# Patient Record
Sex: MALE
[De-identification: names, ages, dates, MRNs, and addresses within clinical notes are randomized per-mention and may not be internally consistent; named-entity substitution may affect disease eponyms.]

---

## 2024-09-30 PROBLEM — Z00.00 ENCOUNTER FOR PREVENTIVE HEALTH EXAMINATION: Status: ACTIVE | Noted: 2024-09-30

## 2024-10-01 ENCOUNTER — RX ONLY (RX ONLY)
Age: 59
End: 2024-10-01

## 2024-10-01 ENCOUNTER — OFFICE (OUTPATIENT)
Dept: URBAN - METROPOLITAN AREA CLINIC 9 | Facility: CLINIC | Age: 59
Setting detail: OPHTHALMOLOGY
End: 2024-10-01
Payer: COMMERCIAL

## 2024-10-01 ENCOUNTER — APPOINTMENT (OUTPATIENT)
Dept: NEUROLOGY | Facility: CLINIC | Age: 59
End: 2024-10-01
Payer: COMMERCIAL

## 2024-10-01 VITALS
BODY MASS INDEX: 21.67 KG/M2 | HEIGHT: 72 IN | SYSTOLIC BLOOD PRESSURE: 133 MMHG | WEIGHT: 160 LBS | HEART RATE: 69 BPM | OXYGEN SATURATION: 97 % | DIASTOLIC BLOOD PRESSURE: 85 MMHG

## 2024-10-01 DIAGNOSIS — M54.16 RADICULOPATHY, LUMBAR REGION: ICD-10-CM

## 2024-10-01 DIAGNOSIS — H11.151: ICD-10-CM

## 2024-10-01 DIAGNOSIS — H52.03: ICD-10-CM

## 2024-10-01 DIAGNOSIS — H25.13: ICD-10-CM

## 2024-10-01 DIAGNOSIS — G40.109 LOCALIZATION-RELATED (FOCAL) (PARTIAL) SYMPTOMATIC EPILEPSY AND EPILEPTIC SYNDROMES WITH SIMPLE PARTIAL SEIZURES, NOT INTRACTABLE, W/OUT STATUS EPILEPTICUS: ICD-10-CM

## 2024-10-01 DIAGNOSIS — H01.001: ICD-10-CM

## 2024-10-01 DIAGNOSIS — H40.1132: ICD-10-CM

## 2024-10-01 DIAGNOSIS — H11.043: ICD-10-CM

## 2024-10-01 DIAGNOSIS — H01.004: ICD-10-CM

## 2024-10-01 PROCEDURE — 92004 COMPRE OPH EXAM NEW PT 1/>: CPT | Performed by: OPHTHALMOLOGY

## 2024-10-01 PROCEDURE — 92250 FUNDUS PHOTOGRAPHY W/I&R: CPT | Performed by: OPHTHALMOLOGY

## 2024-10-01 PROCEDURE — 92015 DETERMINE REFRACTIVE STATE: CPT | Performed by: OPHTHALMOLOGY

## 2024-10-01 PROCEDURE — G2211 COMPLEX E/M VISIT ADD ON: CPT

## 2024-10-01 PROCEDURE — 99205 OFFICE O/P NEW HI 60 MIN: CPT

## 2024-10-01 RX ORDER — PHENYTOIN SODIUM 100 MG/1
100 CAPSULE ORAL
Refills: 0 | Status: ACTIVE | COMMUNITY

## 2024-10-01 RX ORDER — CLONAZEPAM 0.5 MG/1
0.5 TABLET, ORALLY DISINTEGRATING ORAL
Qty: 8 | Refills: 0 | Status: ACTIVE | COMMUNITY
Start: 2024-10-01 | End: 1900-01-01

## 2024-10-01 RX ORDER — PHENYTOIN SODIUM 300 MG/1
300 CAPSULE, EXTENDED RELEASE ORAL
Refills: 0 | Status: ACTIVE | COMMUNITY

## 2024-10-01 RX ORDER — GABAPENTIN 100 MG/1
100 CAPSULE ORAL
Qty: 60 | Refills: 1 | Status: ACTIVE | COMMUNITY
Start: 2024-10-01 | End: 1900-01-01

## 2024-10-01 ASSESSMENT — LID EXAM ASSESSMENTS
OD_MEIBOMITIS: RUL 1+
OS_BLEPHARITIS: LUL 2+
OS_MEIBOMITIS: LUL 2+
OD_BLEPHARITIS: RUL 1+

## 2024-10-01 ASSESSMENT — KERATOMETRY
OD_K1POWER_DIOPTERS: 39.00
OS_K1POWER_DIOPTERS: 39.50
OS_AXISANGLE_DEGREES: 107
OD_AXISANGLE_DEGREES: 090
OS_K2POWER_DIOPTERS: 39.75
OD_K2POWER_DIOPTERS: 39.00

## 2024-10-01 ASSESSMENT — REFRACTION_MANIFEST
OD_ADD: +3.00
OD_CYLINDER: SPHERE
OS_SPHERE: +3.00
OS_VA1: 20/20-1
OD_VA2: 20/20(J1+)
OD_AXIS: 045
OU_VA: 20/20
OS_CYLINDER: +0.25
OS_SPHERE: +0.50
OD_SPHERE: PLANO
OD_VA1: 20/20-1
OD_VA2: 20/20(J1+)
OS_VA2: 20/20(J1+)
OS_CYLINDER: SPHERE
OD_SPHERE: +3.00
OS_ADD: +3.00
OD_CYLINDER: +0.25
OS_VA2: 20/20(J1+)
OS_AXIS: 160

## 2024-10-01 ASSESSMENT — REFRACTION_AUTOREFRACTION
OS_AXIS: 161
OD_AXIS: 043
OS_CYLINDER: +0.25
OD_SPHERE: PLANO
OD_CYLINDER: +0.25
OS_SPHERE: +0.25

## 2024-10-01 ASSESSMENT — CORNEAL PTERYGIUM
OD_PTERYGIUM: ENCROACHING PUPIL 1MM
OS_PTERYGIUM: ENCROACHING PUPIL 2MM

## 2024-10-01 ASSESSMENT — VISUAL ACUITY
OS_BCVA: 20/20-2
OD_BCVA: 20/30

## 2024-10-01 ASSESSMENT — CONFRONTATIONAL VISUAL FIELD TEST (CVF)
OD_FINDINGS: FULL
OS_FINDINGS: FULL

## 2024-10-01 ASSESSMENT — TONOMETRY: OS_IOP_MMHG: 21

## 2024-10-01 NOTE — PHYSICAL EXAM
[FreeTextEntry1] : NEUROLOGIC EXAM:  MENTAL STATUS: Alert and Oriented to person, place and time. Speech is fluent, without aphasia or dysarthria. Able to name, repeat and follow commands. Behavior and affect appropriate to situation.     Eyes injected today, just came from optho dilated exam                    CRANIAL NERVES: CN 2:    Visual fields appear full to confrontation OU CN 3, 4, 6: Extraocular movements are intact. No nystagmus or ophthalmoplegia is evident. Pupils are equally round CN 5:     Facial sensation is intact to light touch in all 3 divisions CN 7:     Facial excursion is full and symmetric bilaterally.  MOTOR: Bilateral upper extremities are antigravity without orbiting or drift. bilateral lower extremities are full range of motion without drift.  SENSORY: Intact to light touch perception in all four extremities DTRS: 2+ throughout, RLE 3+ at knee,  no ankle clonus  No back pain or ttp  COORD: Finger to nose testing without dysmetria bilaterally.  GAIT: Normal station and gait. [Over the Past 2 Weeks, Have You Felt Down, Depressed, or Hopeless?] : 1.) Over the past 2 weeks, have you felt down, depressed, or hopeless? No [Over the Past 2 Weeks, Have You Felt Little Interest or Pleasure Doing Things?] : 2.) Over the past 2 weeks, have you felt little interest or pleasure doing things? No

## 2024-10-01 NOTE — ASSESSMENT
[FreeTextEntry1] : # Post-traumatic Focal epilepsy - check REEG - c/w  ER QHS for now - obtain recent labwork from PCP office including PHT level - MRI brain epilepsy protocol to ascertain epileptic TBI lesion to help on seizure localization - RESCUE: 0.5mg CLONAZEPAM ODT - to be taken after convulsive sz - education provided  >>> INPATIENT EMU VEEG MONITORING NEED: >>> - Diagnostic inpatient video EEG monitoring evaluation over at least 2-3 days, which is medically necessary to obtain both video and EEG for correlation and accurate diagnosis and management. There is significant concern for subclinical seizures that maybe happening during sleep or drowsiness in light of recent breakthrough seizure despite compliance to meds. This evaluation is critical for improving patient's long-term prognosis and quality of life. I am confident that the information obtained from the EMU will lead to a more precise diagnosis and a tailored treatment plan that will ultimately reduce the frequency and severity of patient's seizures.   #RLE paresthesia / numbness - positional - passive compressive neuropathy / radiculopathy - Trial of gabapentin 100 BID - start qhs first and increase to BID if tolerating/helpful - EMG/NCV RLE - MRI L-spine to eval for discopathy, stenosis - PT referral  NO DRIVING advised.  Extensive education and counseling done as per my usual protocol - relevant to the neurological issues above. Patient's and dtr's questions and concerns were addressed, they voiced understanding.  --- ****Patient Discussion / Education***: As per my usual protocol, the patient was advised in regards to risks and driving privileges associated with the [place] Guidelines.  The patient was advised in regards to the risk of seizures and general seizure safety recommendations including not to be bathing alone, climbing to high places and operating heavy machinery. The importance of compliance with medications was reinforced. Sleep hygiene and the risks of sleep disruption were discussed. Surgical options/risks/benefits for intractable epilepsy were discussed where applicable. Patient was asked to obtain copies of pertinent prior medical records or studies. We also discussed SUDEP and importance of medication and follow up compliance at length. All questions and concerns were addressed to patient's satisfaction.  Total time spent on the day of the visit, including pre-visit and post-visit time was 65 minutes.

## 2024-12-06 ENCOUNTER — OFFICE (OUTPATIENT)
Dept: URBAN - METROPOLITAN AREA CLINIC 38 | Facility: CLINIC | Age: 59
Setting detail: OPHTHALMOLOGY
End: 2024-12-06
Payer: COMMERCIAL

## 2024-12-06 DIAGNOSIS — H40.1132: ICD-10-CM

## 2024-12-06 PROCEDURE — 92020 GONIOSCOPY: CPT | Performed by: STUDENT IN AN ORGANIZED HEALTH CARE EDUCATION/TRAINING PROGRAM

## 2024-12-06 PROCEDURE — 99214 OFFICE O/P EST MOD 30 MIN: CPT | Performed by: STUDENT IN AN ORGANIZED HEALTH CARE EDUCATION/TRAINING PROGRAM

## 2024-12-06 PROCEDURE — 92083 EXTENDED VISUAL FIELD XM: CPT | Performed by: STUDENT IN AN ORGANIZED HEALTH CARE EDUCATION/TRAINING PROGRAM

## 2024-12-06 PROCEDURE — 92133 CPTRZD OPH DX IMG PST SGM ON: CPT | Performed by: STUDENT IN AN ORGANIZED HEALTH CARE EDUCATION/TRAINING PROGRAM

## 2024-12-06 PROCEDURE — 76514 ECHO EXAM OF EYE THICKNESS: CPT | Performed by: STUDENT IN AN ORGANIZED HEALTH CARE EDUCATION/TRAINING PROGRAM

## 2024-12-06 ASSESSMENT — REFRACTION_AUTOREFRACTION
OS_AXIS: 079
OD_AXIS: 117
OS_SPHERE: +0.50
OS_CYLINDER: -0.25
OD_SPHERE: +0.25
OD_CYLINDER: -0.25

## 2024-12-06 ASSESSMENT — REFRACTION_MANIFEST
OD_SPHERE: +3.00
OD_SPHERE: PLANO
OD_VA1: 20/20-1
OS_VA1: 20/20-1
OD_VA2: 20/20(J1+)
OS_AXIS: 160
OU_VA: 20/20
OS_CYLINDER: +0.25
OD_CYLINDER: +0.25
OS_ADD: +3.00
OS_CYLINDER: SPHERE
OD_CYLINDER: SPHERE
OS_SPHERE: +3.00
OS_VA2: 20/20(J1+)
OD_AXIS: 045
OD_VA2: 20/20(J1+)
OD_ADD: +3.00
OS_VA2: 20/20(J1+)
OS_SPHERE: +0.50

## 2024-12-06 ASSESSMENT — VISUAL ACUITY
OD_BCVA: 20/25-2
OS_BCVA: 20/20-2

## 2024-12-06 ASSESSMENT — KERATOMETRY
OS_K1POWER_DIOPTERS: 39.50
OD_K1POWER_DIOPTERS: 39.00
OS_K2POWER_DIOPTERS: 39.75
OS_AXISANGLE_DEGREES: 109
OD_AXISANGLE_DEGREES: 051
OD_K2POWER_DIOPTERS: 39.25

## 2024-12-06 ASSESSMENT — CORNEAL PTERYGIUM
OS_PTERYGIUM: ENCROACHING PUPIL 2MM
OD_PTERYGIUM: ENCROACHING PUPIL 1MM

## 2024-12-06 ASSESSMENT — PACHYMETRY
OS_CT_UM: 578
OD_CT_CORRECTION: -3
OS_CT_CORRECTION: -2
OD_CT_UM: 586

## 2024-12-06 ASSESSMENT — LID EXAM ASSESSMENTS
OS_MEIBOMITIS: LUL 2+
OD_MEIBOMITIS: RUL 1+
OS_BLEPHARITIS: LUL 2+
OD_BLEPHARITIS: RUL 1+

## 2024-12-06 ASSESSMENT — CONFRONTATIONAL VISUAL FIELD TEST (CVF)
OS_FINDINGS: FULL
OD_FINDINGS: FULL

## 2025-05-23 ENCOUNTER — OFFICE (OUTPATIENT)
Dept: URBAN - METROPOLITAN AREA CLINIC 38 | Facility: CLINIC | Age: 60
Setting detail: OPHTHALMOLOGY
End: 2025-05-23
Payer: COMMERCIAL

## 2025-05-23 DIAGNOSIS — H01.001: ICD-10-CM

## 2025-05-23 DIAGNOSIS — H11.043: ICD-10-CM

## 2025-05-23 DIAGNOSIS — H25.13: ICD-10-CM

## 2025-05-23 DIAGNOSIS — H40.1132: ICD-10-CM

## 2025-05-23 PROCEDURE — 92133 CPTRZD OPH DX IMG PST SGM ON: CPT | Performed by: STUDENT IN AN ORGANIZED HEALTH CARE EDUCATION/TRAINING PROGRAM

## 2025-05-23 PROCEDURE — 92083 EXTENDED VISUAL FIELD XM: CPT | Performed by: STUDENT IN AN ORGANIZED HEALTH CARE EDUCATION/TRAINING PROGRAM

## 2025-05-23 PROCEDURE — 99213 OFFICE O/P EST LOW 20 MIN: CPT | Performed by: STUDENT IN AN ORGANIZED HEALTH CARE EDUCATION/TRAINING PROGRAM

## 2025-05-23 ASSESSMENT — PACHYMETRY
OD_CT_UM: 586
OS_CT_CORRECTION: -2
OS_CT_UM: 578
OD_CT_CORRECTION: -3

## 2025-05-23 ASSESSMENT — REFRACTION_MANIFEST
OS_VA2: 20/20(J1+)
OD_ADD: +3.00
OD_SPHERE: +3.00
OD_VA2: 20/20(J1+)
OD_AXIS: 045
OS_SPHERE: +3.00
OS_CYLINDER: +0.25
OD_CYLINDER: SPHERE
OD_SPHERE: PLANO
OD_VA2: 20/20(J1+)
OS_VA1: 20/20-1
OU_VA: 20/20
OS_ADD: +3.00
OS_VA2: 20/20(J1+)
OD_CYLINDER: +0.25
OS_AXIS: 160
OD_VA1: 20/20-1
OS_CYLINDER: SPHERE
OS_SPHERE: +0.50

## 2025-05-23 ASSESSMENT — REFRACTION_AUTOREFRACTION
OD_CYLINDER: -0.25
OS_SPHERE: +0.50
OS_CYLINDER: -0.25
OD_SPHERE: +0.25
OD_AXIS: 117
OS_AXIS: 079

## 2025-05-23 ASSESSMENT — KERATOMETRY
OD_AXISANGLE_DEGREES: 051
OS_K2POWER_DIOPTERS: 39.75
OD_K1POWER_DIOPTERS: 39.00
OS_AXISANGLE_DEGREES: 109
OS_K1POWER_DIOPTERS: 39.50
OD_K2POWER_DIOPTERS: 39.25

## 2025-05-23 ASSESSMENT — VISUAL ACUITY
OD_BCVA: 20/25
OS_BCVA: 20/20-2

## 2025-05-23 ASSESSMENT — CONFRONTATIONAL VISUAL FIELD TEST (CVF)
OS_FINDINGS: FULL
OD_FINDINGS: FULL

## 2025-05-23 ASSESSMENT — CORNEAL PTERYGIUM
OS_PTERYGIUM: ENCROACHING PUPIL 2MM
OD_PTERYGIUM: ENCROACHING PUPIL 1MM

## 2025-05-23 ASSESSMENT — LID EXAM ASSESSMENTS
OS_MEIBOMITIS: LUL 2+
OS_BLEPHARITIS: LUL 2+
OD_MEIBOMITIS: RUL 1+
OD_BLEPHARITIS: RUL 1+

## 2025-06-13 ENCOUNTER — OFFICE (OUTPATIENT)
Dept: URBAN - METROPOLITAN AREA CLINIC 38 | Facility: CLINIC | Age: 60
Setting detail: OPHTHALMOLOGY
End: 2025-06-13
Payer: COMMERCIAL

## 2025-06-13 DIAGNOSIS — H40.1112: ICD-10-CM

## 2025-06-13 PROCEDURE — 65855 TRABECULOPLASTY LASER SURG: CPT | Mod: RT | Performed by: STUDENT IN AN ORGANIZED HEALTH CARE EDUCATION/TRAINING PROGRAM

## 2025-06-13 ASSESSMENT — REFRACTION_MANIFEST
OS_VA1: 20/20-1
OD_CYLINDER: +0.25
OD_VA2: 20/20(J1+)
OS_CYLINDER: SPHERE
OD_ADD: +3.00
OS_SPHERE: +0.50
OS_VA2: 20/20(J1+)
OS_VA2: 20/20(J1+)
OD_AXIS: 045
OS_SPHERE: +3.00
OS_CYLINDER: +0.25
OD_SPHERE: PLANO
OD_VA1: 20/20-1
OS_AXIS: 160
OS_ADD: +3.00
OD_VA2: 20/20(J1+)
OU_VA: 20/20
OD_CYLINDER: SPHERE
OD_SPHERE: +3.00

## 2025-06-13 ASSESSMENT — KERATOMETRY
OS_K1POWER_DIOPTERS: 39.50
OD_K2POWER_DIOPTERS: 39.25
OS_AXISANGLE_DEGREES: 090
OD_K1POWER_DIOPTERS: 38.75
OS_K2POWER_DIOPTERS: 39.50
OD_AXISANGLE_DEGREES: 049

## 2025-06-13 ASSESSMENT — CORNEAL PTERYGIUM
OS_PTERYGIUM: ENCROACHING PUPIL 2MM
OD_PTERYGIUM: ENCROACHING PUPIL 1MM

## 2025-06-13 ASSESSMENT — PACHYMETRY
OD_CT_CORRECTION: -3
OS_CT_CORRECTION: -2
OD_CT_UM: 586
OS_CT_UM: 578

## 2025-06-13 ASSESSMENT — REFRACTION_AUTOREFRACTION
OD_CYLINDER: -0.50
OS_CYLINDER: -0.50
OS_SPHERE: +0.75
OD_SPHERE: +0.75
OS_AXIS: 086
OD_AXIS: 130

## 2025-06-13 ASSESSMENT — CONFRONTATIONAL VISUAL FIELD TEST (CVF)
OS_FINDINGS: FULL
OD_FINDINGS: FULL

## 2025-06-13 ASSESSMENT — VISUAL ACUITY
OD_BCVA: 20/20
OS_BCVA: 20/20-2

## 2025-06-13 ASSESSMENT — TONOMETRY: OD_IOP_MMHG: 19

## 2025-06-27 ENCOUNTER — OFFICE (OUTPATIENT)
Dept: URBAN - METROPOLITAN AREA CLINIC 38 | Facility: CLINIC | Age: 60
Setting detail: OPHTHALMOLOGY
End: 2025-06-27
Payer: COMMERCIAL

## 2025-06-27 DIAGNOSIS — H40.1122: ICD-10-CM

## 2025-06-27 PROCEDURE — 65855 TRABECULOPLASTY LASER SURG: CPT | Mod: LT | Performed by: STUDENT IN AN ORGANIZED HEALTH CARE EDUCATION/TRAINING PROGRAM

## 2025-06-27 ASSESSMENT — REFRACTION_MANIFEST
OD_VA1: 20/20-1
OU_VA: 20/20
OS_SPHERE: +0.50
OS_ADD: +3.00
OD_CYLINDER: +0.25
OS_SPHERE: +3.00
OD_AXIS: 045
OS_VA1: 20/20-1
OS_VA2: 20/20(J1+)
OD_ADD: +3.00
OD_CYLINDER: SPHERE
OS_AXIS: 160
OD_VA2: 20/20(J1+)
OD_SPHERE: +3.00
OS_CYLINDER: +0.25
OS_VA2: 20/20(J1+)
OD_VA2: 20/20(J1+)
OS_CYLINDER: SPHERE
OD_SPHERE: PLANO

## 2025-06-27 ASSESSMENT — LID EXAM ASSESSMENTS
OS_BLEPHARITIS: LUL 2+
OS_MEIBOMITIS: LUL 2+
OD_MEIBOMITIS: RUL 1+
OD_BLEPHARITIS: RUL 1+

## 2025-06-27 ASSESSMENT — PACHYMETRY
OD_CT_UM: 586
OD_CT_CORRECTION: -3
OS_CT_UM: 578
OS_CT_CORRECTION: -2

## 2025-06-27 ASSESSMENT — REFRACTION_AUTOREFRACTION
OS_AXIS: 086
OD_AXIS: 130
OD_SPHERE: +0.75
OD_CYLINDER: -0.50
OS_SPHERE: +0.75
OS_CYLINDER: -0.50

## 2025-06-27 ASSESSMENT — VISUAL ACUITY
OS_BCVA: 20/20
OD_BCVA: 20/20-2

## 2025-06-27 ASSESSMENT — CONFRONTATIONAL VISUAL FIELD TEST (CVF)
OD_FINDINGS: FULL
OS_FINDINGS: FULL

## 2025-06-27 ASSESSMENT — KERATOMETRY
OS_K2POWER_DIOPTERS: 39.50
OS_AXISANGLE_DEGREES: 090
OD_K2POWER_DIOPTERS: 39.25
OD_K1POWER_DIOPTERS: 38.75
OS_K1POWER_DIOPTERS: 39.50
OD_AXISANGLE_DEGREES: 049

## 2025-06-27 ASSESSMENT — CORNEAL PTERYGIUM
OS_PTERYGIUM: ENCROACHING PUPIL 2MM
OD_PTERYGIUM: ENCROACHING PUPIL 1MM

## 2025-07-18 ENCOUNTER — OFFICE (OUTPATIENT)
Dept: URBAN - METROPOLITAN AREA CLINIC 38 | Facility: CLINIC | Age: 60
Setting detail: OPHTHALMOLOGY
End: 2025-07-18
Payer: COMMERCIAL

## 2025-07-18 ENCOUNTER — RX ONLY (RX ONLY)
Age: 60
End: 2025-07-18

## 2025-07-18 DIAGNOSIS — H40.2232: ICD-10-CM

## 2025-07-18 PROBLEM — H40.2212 CHRONIC ANGLE CLOSURE GLAUCOMA; RIGHT EYE MODERATE, LEFT EYE MODERATE: Status: ACTIVE | Noted: 2025-07-18

## 2025-07-18 PROBLEM — H40.2222 CHRONIC ANGLE CLOSURE GLAUCOMA; RIGHT EYE MODERATE, LEFT EYE MODERATE: Status: ACTIVE | Noted: 2025-07-18

## 2025-07-18 PROCEDURE — 99213 OFFICE O/P EST LOW 20 MIN: CPT | Performed by: STUDENT IN AN ORGANIZED HEALTH CARE EDUCATION/TRAINING PROGRAM

## 2025-07-18 PROCEDURE — 92020 GONIOSCOPY: CPT | Performed by: STUDENT IN AN ORGANIZED HEALTH CARE EDUCATION/TRAINING PROGRAM

## 2025-07-18 ASSESSMENT — CORNEAL PTERYGIUM
OD_PTERYGIUM: ENCROACHING PUPIL 1MM
OS_PTERYGIUM: ENCROACHING PUPIL 2MM

## 2025-07-18 ASSESSMENT — REFRACTION_AUTOREFRACTION
OS_AXIS: 087
OS_CYLINDER: -0.50
OD_CYLINDER: -0.50
OD_AXIS: 135
OD_SPHERE: +0.50
OS_SPHERE: +0.75

## 2025-07-18 ASSESSMENT — PACHYMETRY
OD_CT_UM: 586
OD_CT_CORRECTION: -3
OS_CT_UM: 578
OS_CT_CORRECTION: -2

## 2025-07-18 ASSESSMENT — REFRACTION_MANIFEST
OD_CYLINDER: +0.25
OD_CYLINDER: SPHERE
OS_SPHERE: +0.50
OD_VA2: 20/20(J1+)
OS_VA2: 20/20(J1+)
OS_CYLINDER: +0.25
OS_AXIS: 160
OD_ADD: +3.00
OS_VA1: 20/20-1
OS_SPHERE: +3.00
OD_VA2: 20/20(J1+)
OD_VA1: 20/20-1
OU_VA: 20/20
OS_VA2: 20/20(J1+)
OD_SPHERE: PLANO
OS_ADD: +3.00
OS_CYLINDER: SPHERE
OD_SPHERE: +3.00
OD_AXIS: 045

## 2025-07-18 ASSESSMENT — LID EXAM ASSESSMENTS
OS_MEIBOMITIS: LUL 2+
OD_MEIBOMITIS: RUL 1+
OS_BLEPHARITIS: LUL 2+
OD_BLEPHARITIS: RUL 1+

## 2025-07-18 ASSESSMENT — KERATOMETRY
OD_K2POWER_DIOPTERS: 39.25
OS_K1POWER_DIOPTERS: 39.50
OS_K2POWER_DIOPTERS: 39.75
OS_AXISANGLE_DEGREES: 121
OD_AXISANGLE_DEGREES: 064
OD_K1POWER_DIOPTERS: 39.00

## 2025-07-18 ASSESSMENT — VISUAL ACUITY
OS_BCVA: 20/20
OD_BCVA: 20/20-2

## 2025-07-18 ASSESSMENT — CONFRONTATIONAL VISUAL FIELD TEST (CVF)
OD_FINDINGS: FULL
OS_FINDINGS: FULL

## 2025-08-08 ENCOUNTER — OFFICE (OUTPATIENT)
Dept: URBAN - METROPOLITAN AREA CLINIC 38 | Facility: CLINIC | Age: 60
Setting detail: OPHTHALMOLOGY
End: 2025-08-08
Payer: COMMERCIAL

## 2025-08-08 DIAGNOSIS — H40.2212: ICD-10-CM

## 2025-08-08 PROCEDURE — 66761 REVISION OF IRIS: CPT | Mod: RT | Performed by: STUDENT IN AN ORGANIZED HEALTH CARE EDUCATION/TRAINING PROGRAM

## 2025-08-08 ASSESSMENT — REFRACTION_AUTOREFRACTION
OS_AXIS: 076
OD_AXIS: 132
OS_SPHERE: +1.00
OS_CYLINDER: -0.50
OD_SPHERE: +0.50
OD_CYLINDER: -0.25

## 2025-08-08 ASSESSMENT — REFRACTION_MANIFEST
OD_ADD: +3.00
OS_VA1: 20/20-1
OS_SPHERE: +0.50
OS_ADD: +3.00
OD_AXIS: 045
OS_AXIS: 160
OS_CYLINDER: SPHERE
OD_CYLINDER: SPHERE
OU_VA: 20/20
OD_VA2: 20/20(J1+)
OS_VA2: 20/20(J1+)
OD_SPHERE: PLANO
OD_SPHERE: +3.00
OD_CYLINDER: +0.25
OS_SPHERE: +3.00
OD_VA1: 20/20-1
OS_CYLINDER: +0.25
OD_VA2: 20/20(J1+)
OS_VA2: 20/20(J1+)

## 2025-08-08 ASSESSMENT — VISUAL ACUITY
OS_BCVA: 20/20-
OD_BCVA: 20/25+2

## 2025-08-08 ASSESSMENT — CONFRONTATIONAL VISUAL FIELD TEST (CVF)
OD_FINDINGS: FULL
OS_FINDINGS: FULL

## 2025-08-08 ASSESSMENT — KERATOMETRY
OD_K1POWER_DIOPTERS: 39.00
OD_K2POWER_DIOPTERS: 39.25
OS_K2POWER_DIOPTERS: 39.50
METHOD_AUTO_MANUAL: AUTO
OS_K1POWER_DIOPTERS: 39.25
OS_AXISANGLE_DEGREES: 122
OD_AXISANGLE_DEGREES: 051

## 2025-08-08 ASSESSMENT — LID EXAM ASSESSMENTS
OD_BLEPHARITIS: RUL 1+
OD_MEIBOMITIS: RUL 1+
OS_MEIBOMITIS: LUL 2+
OS_BLEPHARITIS: LUL 2+

## 2025-08-08 ASSESSMENT — PACHYMETRY
OS_CT_UM: 578
OD_CT_UM: 586
OS_CT_CORRECTION: -2
OD_CT_CORRECTION: -3

## 2025-08-08 ASSESSMENT — CORNEAL PTERYGIUM
OS_PTERYGIUM: ENCROACHING PUPIL 2MM
OD_PTERYGIUM: ENCROACHING PUPIL 1MM

## 2025-08-08 ASSESSMENT — TONOMETRY
OD_IOP_MMHG: 18
OS_IOP_MMHG: 18

## 2025-08-22 ENCOUNTER — OFFICE (OUTPATIENT)
Dept: URBAN - METROPOLITAN AREA CLINIC 38 | Facility: CLINIC | Age: 60
Setting detail: OPHTHALMOLOGY
End: 2025-08-22
Payer: COMMERCIAL

## 2025-08-22 ENCOUNTER — RX ONLY (RX ONLY)
Age: 60
End: 2025-08-22

## 2025-08-22 DIAGNOSIS — H40.2222: ICD-10-CM

## 2025-08-22 PROCEDURE — 66761 REVISION OF IRIS: CPT | Mod: LT | Performed by: STUDENT IN AN ORGANIZED HEALTH CARE EDUCATION/TRAINING PROGRAM

## 2025-08-22 ASSESSMENT — REFRACTION_MANIFEST
OD_CYLINDER: +0.25
OD_AXIS: 045
OU_VA: 20/20
OS_SPHERE: +3.00
OD_SPHERE: +3.00
OS_CYLINDER: +0.25
OD_VA2: 20/20(J1+)
OD_VA1: 20/20-1
OD_CYLINDER: SPHERE
OS_VA1: 20/20-1
OD_ADD: +3.00
OD_SPHERE: PLANO
OS_ADD: +3.00
OS_SPHERE: +0.50
OS_VA2: 20/20(J1+)
OS_VA2: 20/20(J1+)
OD_VA2: 20/20(J1+)
OS_CYLINDER: SPHERE
OS_AXIS: 160

## 2025-08-22 ASSESSMENT — REFRACTION_AUTOREFRACTION
OS_CYLINDER: -0.50
OD_SPHERE: +0.25
OD_AXIS: 162
OS_SPHERE: +0.75
OD_CYLINDER: -0.25
OS_AXIS: 079

## 2025-08-22 ASSESSMENT — CORNEAL PTERYGIUM
OD_PTERYGIUM: ENCROACHING PUPIL 1MM
OS_PTERYGIUM: ENCROACHING PUPIL 2MM

## 2025-08-22 ASSESSMENT — PACHYMETRY
OS_CT_UM: 578
OD_CT_CORRECTION: -3
OS_CT_CORRECTION: -2
OD_CT_UM: 586

## 2025-08-22 ASSESSMENT — KERATOMETRY
OS_K2POWER_DIOPTERS: 39.50
OS_K1POWER_DIOPTERS: 39.50
OS_AXISANGLE_DEGREES: 090
OD_AXISANGLE_DEGREES: 078
OD_K2POWER_DIOPTERS: 39.50
OD_K1POWER_DIOPTERS: 39.25
METHOD_AUTO_MANUAL: AUTO

## 2025-08-22 ASSESSMENT — CONFRONTATIONAL VISUAL FIELD TEST (CVF)
OS_FINDINGS: FULL
OD_FINDINGS: FULL

## 2025-08-22 ASSESSMENT — VISUAL ACUITY
OD_BCVA: 20/25-
OS_BCVA: 20/30-2

## 2025-08-22 ASSESSMENT — TONOMETRY: OS_IOP_MMHG: 21
